# Patient Record
Sex: MALE | Race: WHITE | Employment: FULL TIME
[De-identification: names, ages, dates, MRNs, and addresses within clinical notes are randomized per-mention and may not be internally consistent; named-entity substitution may affect disease eponyms.]

---

## 2022-11-11 ENCOUNTER — OFFICE VISIT (OUTPATIENT)
Dept: INTERNAL MEDICINE CLINIC | Facility: CLINIC | Age: 37
End: 2022-11-11
Payer: COMMERCIAL

## 2022-11-11 VITALS
DIASTOLIC BLOOD PRESSURE: 88 MMHG | WEIGHT: 174 LBS | HEART RATE: 70 BPM | SYSTOLIC BLOOD PRESSURE: 117 MMHG | HEIGHT: 70 IN | TEMPERATURE: 98.1 F | OXYGEN SATURATION: 97 % | BODY MASS INDEX: 24.91 KG/M2

## 2022-11-11 DIAGNOSIS — R10.30 LOWER ABDOMINAL PAIN: Primary | ICD-10-CM

## 2022-11-11 DIAGNOSIS — Z23 NEED FOR PROPHYLACTIC VACCINATION AND INOCULATION AGAINST CHOLERA ALONE: ICD-10-CM

## 2022-11-11 PROCEDURE — G8482 FLU IMMUNIZE ORDER/ADMIN: HCPCS | Performed by: INTERNAL MEDICINE

## 2022-11-11 PROCEDURE — 1036F TOBACCO NON-USER: CPT | Performed by: INTERNAL MEDICINE

## 2022-11-11 PROCEDURE — 99203 OFFICE O/P NEW LOW 30 MIN: CPT | Performed by: INTERNAL MEDICINE

## 2022-11-11 PROCEDURE — 90674 CCIIV4 VAC NO PRSV 0.5 ML IM: CPT | Performed by: INTERNAL MEDICINE

## 2022-11-11 PROCEDURE — G8427 DOCREV CUR MEDS BY ELIG CLIN: HCPCS | Performed by: INTERNAL MEDICINE

## 2022-11-11 PROCEDURE — G8420 CALC BMI NORM PARAMETERS: HCPCS | Performed by: INTERNAL MEDICINE

## 2022-11-11 PROCEDURE — 90471 IMMUNIZATION ADMIN: CPT | Performed by: INTERNAL MEDICINE

## 2022-11-11 RX ORDER — GUSELKUMAB 100 MG/ML
INJECTION SUBCUTANEOUS
COMMUNITY

## 2022-11-11 ASSESSMENT — PATIENT HEALTH QUESTIONNAIRE - PHQ9
SUM OF ALL RESPONSES TO PHQ9 QUESTIONS 1 & 2: 0
2. FEELING DOWN, DEPRESSED OR HOPELESS: 0
SUM OF ALL RESPONSES TO PHQ QUESTIONS 1-9: 0
1. LITTLE INTEREST OR PLEASURE IN DOING THINGS: 0
SUM OF ALL RESPONSES TO PHQ QUESTIONS 1-9: 0

## 2022-11-11 ASSESSMENT — ENCOUNTER SYMPTOMS
VOMITING: 0
NAUSEA: 0
BLOOD IN STOOL: 0
FLATUS: 0
DIARRHEA: 0
HEMATOCHEZIA: 0
ABDOMINAL PAIN: 1
CONSTIPATION: 0
RECTAL PAIN: 0
SHORTNESS OF BREATH: 0

## 2022-11-11 ASSESSMENT — CROHNS DISEASE ACTIVITY INDEX (CDAI): CDAI SCORE: 0

## 2022-11-11 NOTE — PROGRESS NOTES
Trerie Brar M.D. Internal Medicine  [address] , [address]  Office : [phone]  Fax : [phone]    Chief Complaint   Patient presents with    Abdominal Pain     Abdominal pain some diarrhea x2weeks         History of Present Illness:  Elder Villela is a 40 y.o. male. Abdominal Pain  This is a new problem. The onset quality is gradual. The problem occurs intermittently. The most recent episode lasted 3 hours. The problem has been waxing and waning. The pain is located in the LLQ, RLQ and suprapubic region. The pain is mild. The quality of the pain is cramping. Pertinent negatives include no constipation, diarrhea, fever, flatus, hematochezia, melena, nausea or vomiting. Associated symptoms comments: 1 loose stool yeasterday. The pain is aggravated by eating. The pain is relieved by Nothing. Treatments tried: pepto-bismol. The treatment provided mild relief. There is no history of Crohn's disease or ulcerative colitis. Past Medical History:  Past Medical History:   Diagnosis Date    Allergic rhinitis     never tested    Furuncle of left lower limb     GERD (gastroesophageal reflux disease)     Psoriasis     Varicella      Past Surgical History:  Past Surgical History:   Procedure Laterality Date    HERNIA REPAIR Bilateral     SEPTOPLASTY      nasal fracture    WISDOM TOOTH EXTRACTION       Allergies:   No Known Allergies  Medications:   Current Outpatient Medications   Medication Sig Dispense Refill    Guselkumab (TREMFYA) 100 MG/ML SOPN Inject into the skin      calcipotriene (DOVONEX) 0.005 % cream APPLY TO AFFECTED AREA TWO (2) TIMES A DAY. (Patient not taking: Reported on 11/11/2022)       No current facility-administered medications for this visit. Social History:  Social History     Tobacco Use    Smoking status: Never    Smokeless tobacco: Never   Substance Use Topics    Alcohol use:  Yes     Alcohol/week: 4.0 standard drinks     Family History  Family History   Problem Relation Age of Onset    Lupus Sister     Cancer Father         skin       Review of Systems   Constitutional:  Negative for chills, fatigue, fever and unexpected weight change. Respiratory:  Negative for shortness of breath. Cardiovascular:  Negative for chest pain. Gastrointestinal:  Positive for abdominal pain. Negative for blood in stool, constipation, diarrhea, flatus, hematochezia, melena, nausea, rectal pain and vomiting. Vital Signs  /88 (Site: Left Upper Arm, Position: Sitting, Cuff Size: Large Adult)   Pulse 70   Temp 98.1 °F (36.7 °C) (Temporal)   Ht 5' 10\" (1.778 m)   Wt 174 lb (78.9 kg)   SpO2 97%   BMI 24.97 kg/m²   Body mass index is 24.97 kg/m². Physical Exam  Vitals reviewed. Constitutional:       General: He is not in acute distress. Appearance: Normal appearance. He is normal weight. He is not ill-appearing, toxic-appearing or diaphoretic. HENT:      Head: Normocephalic and atraumatic. Eyes:      General: No scleral icterus. Cardiovascular:      Rate and Rhythm: Normal rate and regular rhythm. Heart sounds: Normal heart sounds. No murmur heard. Pulmonary:      Effort: Pulmonary effort is normal.      Breath sounds: Normal breath sounds. Abdominal:      General: Abdomen is flat. Bowel sounds are normal. There is no distension or abdominal bruit. Palpations: Abdomen is soft. There is no hepatomegaly, splenomegaly, mass or pulsatile mass. Tenderness: There is abdominal tenderness in the left lower quadrant. There is rebound. There is no guarding. Negative signs include Hdez's sign and McBurney's sign. Hernia: No hernia is present. Skin:     Coloration: Skin is not jaundiced. Findings: No rash. Neurological:      General: No focal deficit present. Mental Status: He is alert. Mental status is at baseline. Cranial Nerves: No cranial nerve deficit. Motor: No weakness. Gait: Gait normal.   Psychiatric:         Mood and Affect: Mood normal.         Behavior: Behavior normal.         Thought Content: Thought content normal.         Judgment: Judgment normal.         Assessment/Plan:  Derl Closs was seen today for abdominal pain. Diagnoses and all orders for this visit:    Lower abdominal pain  -     US ABDOMEN COMPLETE; Future    Need for prophylactic vaccination and inoculation against cholera alone  -     Influenza, FLUCELVAX, (age 10 mo+), IM, Preservative Free, 0.5 mL  Differential diagnosis include IBS, Diverticulitis, Colitis and other issues. Trial low FODMAP diet. Pepto bismol if has loose stools    Return in about 4 weeks (around 12/9/2022), or if symptoms worsen or fail to improve.   __  Luz Elena Graff M.D.

## 2022-11-29 ENCOUNTER — HOSPITAL ENCOUNTER (OUTPATIENT)
Dept: ULTRASOUND IMAGING | Age: 37
Discharge: HOME | End: 2022-12-02
Payer: COMMERCIAL

## 2022-11-29 DIAGNOSIS — R10.30 LOWER ABDOMINAL PAIN: ICD-10-CM

## 2022-11-29 PROCEDURE — 76700 US EXAM ABDOM COMPLETE: CPT

## 2022-12-09 ENCOUNTER — OFFICE VISIT (OUTPATIENT)
Dept: INTERNAL MEDICINE CLINIC | Facility: CLINIC | Age: 37
End: 2022-12-09

## 2022-12-09 VITALS
OXYGEN SATURATION: 97 % | BODY MASS INDEX: 25.2 KG/M2 | DIASTOLIC BLOOD PRESSURE: 69 MMHG | HEART RATE: 70 BPM | TEMPERATURE: 97.7 F | HEIGHT: 70 IN | SYSTOLIC BLOOD PRESSURE: 112 MMHG | WEIGHT: 176 LBS

## 2022-12-09 DIAGNOSIS — Z00.00 ANNUAL PHYSICAL EXAM: Primary | ICD-10-CM

## 2022-12-09 DIAGNOSIS — Z00.00 ANNUAL PHYSICAL EXAM: ICD-10-CM

## 2022-12-09 LAB
ALBUMIN SERPL-MCNC: 4.3 G/DL (ref 3.5–5)
ALBUMIN/GLOB SERPL: 1.5 {RATIO} (ref 0.4–1.6)
ALP SERPL-CCNC: 62 U/L (ref 50–136)
ALT SERPL-CCNC: 35 U/L (ref 12–65)
ANION GAP SERPL CALC-SCNC: 4 MMOL/L (ref 2–11)
APPEARANCE UR: CLEAR
AST SERPL-CCNC: 23 U/L (ref 15–37)
BASOPHILS # BLD: 0.1 K/UL (ref 0–0.2)
BASOPHILS NFR BLD: 1 % (ref 0–2)
BILIRUB DIRECT SERPL-MCNC: 0.1 MG/DL
BILIRUB SERPL-MCNC: 0.6 MG/DL (ref 0.2–1.1)
BILIRUB UR QL: NEGATIVE
BUN SERPL-MCNC: 12 MG/DL (ref 6–23)
CALCIUM SERPL-MCNC: 9.2 MG/DL (ref 8.3–10.4)
CHLORIDE SERPL-SCNC: 109 MMOL/L (ref 101–110)
CHOLEST SERPL-MCNC: 201 MG/DL
CO2 SERPL-SCNC: 27 MMOL/L (ref 21–32)
COLOR UR: NORMAL
CREAT SERPL-MCNC: 0.9 MG/DL (ref 0.8–1.5)
DIFFERENTIAL METHOD BLD: NORMAL
EOSINOPHIL # BLD: 0.1 K/UL (ref 0–0.8)
EOSINOPHIL NFR BLD: 2 % (ref 0.5–7.8)
ERYTHROCYTE [DISTWIDTH] IN BLOOD BY AUTOMATED COUNT: 12.8 % (ref 11.9–14.6)
EST. AVERAGE GLUCOSE BLD GHB EST-MCNC: 103 MG/DL
GLOBULIN SER CALC-MCNC: 2.9 G/DL (ref 2.8–4.5)
GLUCOSE SERPL-MCNC: 84 MG/DL (ref 65–100)
GLUCOSE UR STRIP.AUTO-MCNC: NEGATIVE MG/DL
HBA1C MFR BLD: 5.2 % (ref 4.8–5.6)
HCT VFR BLD AUTO: 46.7 % (ref 41.1–50.3)
HDLC SERPL-MCNC: 49 MG/DL (ref 40–60)
HDLC SERPL: 4.1 {RATIO}
HGB BLD-MCNC: 15.5 G/DL (ref 13.6–17.2)
HGB UR QL STRIP: NEGATIVE
IMM GRANULOCYTES # BLD AUTO: 0 K/UL (ref 0–0.5)
IMM GRANULOCYTES NFR BLD AUTO: 1 % (ref 0–5)
KETONES UR QL STRIP.AUTO: NEGATIVE MG/DL
LDLC SERPL CALC-MCNC: 134 MG/DL
LEUKOCYTE ESTERASE UR QL STRIP.AUTO: NEGATIVE
LYMPHOCYTES # BLD: 0.9 K/UL (ref 0.5–4.6)
LYMPHOCYTES NFR BLD: 13 % (ref 13–44)
MCH RBC QN AUTO: 31.6 PG (ref 26.1–32.9)
MCHC RBC AUTO-ENTMCNC: 33.2 G/DL (ref 31.4–35)
MCV RBC AUTO: 95.3 FL (ref 82–102)
MONOCYTES # BLD: 0.6 K/UL (ref 0.1–1.3)
MONOCYTES NFR BLD: 9 % (ref 4–12)
NEUTS SEG # BLD: 5.2 K/UL (ref 1.7–8.2)
NEUTS SEG NFR BLD: 74 % (ref 43–78)
NITRITE UR QL STRIP.AUTO: NEGATIVE
NRBC # BLD: 0 K/UL (ref 0–0.2)
PH UR STRIP: 7 (ref 5–9)
PLATELET # BLD AUTO: 304 K/UL (ref 150–450)
PMV BLD AUTO: 10 FL (ref 9.4–12.3)
POTASSIUM SERPL-SCNC: 4.2 MMOL/L (ref 3.5–5.1)
PROT SERPL-MCNC: 7.2 G/DL (ref 6.3–8.2)
PROT UR STRIP-MCNC: NEGATIVE MG/DL
RBC # BLD AUTO: 4.9 M/UL (ref 4.23–5.6)
SODIUM SERPL-SCNC: 140 MMOL/L (ref 133–143)
SP GR UR REFRACTOMETRY: 1.01 (ref 1–1.02)
TRIGL SERPL-MCNC: 90 MG/DL (ref 35–150)
TSH, 3RD GENERATION: 1.13 UIU/ML (ref 0.36–3.74)
UROBILINOGEN UR QL STRIP.AUTO: 0.2 EU/DL (ref 0.2–1)
VLDLC SERPL CALC-MCNC: 18 MG/DL (ref 6–23)
WBC # BLD AUTO: 6.9 K/UL (ref 4.3–11.1)

## 2022-12-09 ASSESSMENT — PATIENT HEALTH QUESTIONNAIRE - PHQ9
SUM OF ALL RESPONSES TO PHQ QUESTIONS 1-9: 0
2. FEELING DOWN, DEPRESSED OR HOPELESS: 0
SUM OF ALL RESPONSES TO PHQ QUESTIONS 1-9: 0
SUM OF ALL RESPONSES TO PHQ9 QUESTIONS 1 & 2: 0
SUM OF ALL RESPONSES TO PHQ QUESTIONS 1-9: 0
SUM OF ALL RESPONSES TO PHQ QUESTIONS 1-9: 0
1. LITTLE INTEREST OR PLEASURE IN DOING THINGS: 0

## 2022-12-09 ASSESSMENT — ENCOUNTER SYMPTOMS
EYE REDNESS: 0
ABDOMINAL PAIN: 0
FACIAL SWELLING: 0
SHORTNESS OF BREATH: 0

## 2022-12-09 NOTE — PROGRESS NOTES
Mil Godinez M.D. Internal Medicine  [address] [place] [address]  Office : [phone]  Fax : [phone]    Chief Complaint   Patient presents with    Annual Exam     Annual CPE       History of Present Illness:  Isacc Michael is a 40 y.o. male. HPI    Wellness Physical  Patient feels well today. Health Maintenance updated and appropriate screenings/preventative treatments are ordered if patient gives consent. Counseling/anticipatory guidance/risk factor reduction interventions appropriate for this patient are discussed including but not limited to:    Achieving normal body weight with low carb diet and intermittent fasting  Regular moderate exercise  Smoking cessation if appropriate  Avoidance of excessive alcohol/caffeine use  Seatbelt use  Keeping medical appointments  Taking medication as directed        Past Medical History:  Past Medical History:   Diagnosis Date    Allergic rhinitis     never tested    Furuncle of left lower limb     GERD (gastroesophageal reflux disease)     Psoriasis     Varicella      Past Surgical History:  Past Surgical History:   Procedure Laterality Date    HERNIA REPAIR Bilateral     SEPTOPLASTY      nasal fracture    WISDOM TOOTH EXTRACTION       Allergies:   No Known Allergies  Medications:   Current Outpatient Medications   Medication Sig Dispense Refill    Guselkumab (TREMFYA) 100 MG/ML SOPN Inject into the skin      calcipotriene (DOVONEX) 0.005 % cream APPLY TO AFFECTED AREA TWO (2) TIMES A DAY. (Patient not taking: No sig reported)       No current facility-administered medications for this visit. Social History:  Social History     Tobacco Use    Smoking status: Never    Smokeless tobacco: Never   Substance Use Topics    Alcohol use:  Yes     Alcohol/week: 4.0 standard drinks     Family History  Family History   Problem Relation Age of Onset    Lupus Sister     Cancer Father         skin Review of Systems   Constitutional:  Negative for chills, fatigue and fever. HENT:  Negative for facial swelling. Eyes:  Negative for redness. Respiratory:  Negative for shortness of breath. Cardiovascular:  Negative for chest pain. Gastrointestinal:  Negative for abdominal pain. Genitourinary:  Negative for flank pain. Musculoskeletal:  Negative for gait problem. Skin:  Negative for rash. Neurological:  Negative for speech difficulty. Psychiatric/Behavioral:  Negative for dysphoric mood. Vital Signs  /69 (Site: Left Upper Arm, Position: Sitting, Cuff Size: Large Adult)   Pulse 70   Temp 97.7 °F (36.5 °C) (Temporal)   Ht 5' 10\" (1.778 m)   Wt 176 lb (79.8 kg)   SpO2 97%   BMI 25.25 kg/m²   Body mass index is 25.25 kg/m². Physical Exam  Vitals reviewed. Constitutional:       General: He is not in acute distress. Appearance: Normal appearance. He is not ill-appearing or toxic-appearing. HENT:      Head: Normocephalic and atraumatic. Right Ear: Tympanic membrane, ear canal and external ear normal. There is no impacted cerumen. Left Ear: Tympanic membrane, ear canal and external ear normal. There is no impacted cerumen. Nose: Nose normal.      Mouth/Throat:      Mouth: Mucous membranes are moist.      Pharynx: No oropharyngeal exudate or posterior oropharyngeal erythema. Eyes:      General: No scleral icterus. Extraocular Movements: Extraocular movements intact. Conjunctiva/sclera: Conjunctivae normal.   Neck:      Vascular: No carotid bruit. Cardiovascular:      Rate and Rhythm: Normal rate and regular rhythm. Heart sounds: Normal heart sounds. No murmur heard. Pulmonary:      Effort: Pulmonary effort is normal.      Breath sounds: Normal breath sounds. Abdominal:      General: Bowel sounds are normal. There is no distension. Palpations: Abdomen is soft. There is no mass. Tenderness: There is no abdominal tenderness. Musculoskeletal:         General: No swelling. Skin:     Coloration: Skin is not jaundiced. Findings: No rash. Neurological:      General: No focal deficit present. Mental Status: He is alert. Mental status is at baseline. Cranial Nerves: No cranial nerve deficit. Motor: No weakness. Gait: Gait normal.      Deep Tendon Reflexes: Reflexes normal.   Psychiatric:         Mood and Affect: Mood normal.         Behavior: Behavior normal.         Thought Content: Thought content normal.         Judgment: Judgment normal.         Assessment/Plan:  Erin Stapleton was seen today for annual exam.    Diagnoses and all orders for this visit:    Annual physical exam  -     Basic Metabolic Panel; Future  -     CBC with Auto Differential; Future  -     Hepatic Function Panel; Future  -     TSH; Future  -     Lipid Panel; Future  -     Urinalysis; Future  -     Hemoglobin A1C; Future      Return in about 1 year (around 12/9/2023), or if symptoms worsen or fail to improve.   __  Natasha Patel M.D.

## 2024-03-11 ENCOUNTER — OFFICE VISIT (OUTPATIENT)
Dept: INTERNAL MEDICINE CLINIC | Facility: CLINIC | Age: 39
End: 2024-03-11
Payer: COMMERCIAL

## 2024-03-11 VITALS
HEIGHT: 71 IN | WEIGHT: 177 LBS | TEMPERATURE: 97.9 F | BODY MASS INDEX: 24.78 KG/M2 | HEART RATE: 56 BPM | SYSTOLIC BLOOD PRESSURE: 111 MMHG | DIASTOLIC BLOOD PRESSURE: 76 MMHG | OXYGEN SATURATION: 100 %

## 2024-03-11 DIAGNOSIS — Z01.89 PATIENT REQUEST FOR DIAGNOSTIC TESTING: ICD-10-CM

## 2024-03-11 DIAGNOSIS — Z00.00 ANNUAL PHYSICAL EXAM: Primary | ICD-10-CM

## 2024-03-11 PROCEDURE — 99395 PREV VISIT EST AGE 18-39: CPT | Performed by: INTERNAL MEDICINE

## 2024-03-11 SDOH — ECONOMIC STABILITY: INCOME INSECURITY: HOW HARD IS IT FOR YOU TO PAY FOR THE VERY BASICS LIKE FOOD, HOUSING, MEDICAL CARE, AND HEATING?: NOT HARD AT ALL

## 2024-03-11 SDOH — ECONOMIC STABILITY: FOOD INSECURITY: WITHIN THE PAST 12 MONTHS, YOU WORRIED THAT YOUR FOOD WOULD RUN OUT BEFORE YOU GOT MONEY TO BUY MORE.: NEVER TRUE

## 2024-03-11 SDOH — ECONOMIC STABILITY: HOUSING INSECURITY
IN THE LAST 12 MONTHS, WAS THERE A TIME WHEN YOU DID NOT HAVE A STEADY PLACE TO SLEEP OR SLEPT IN A SHELTER (INCLUDING NOW)?: NO

## 2024-03-11 SDOH — ECONOMIC STABILITY: FOOD INSECURITY: WITHIN THE PAST 12 MONTHS, THE FOOD YOU BOUGHT JUST DIDN'T LAST AND YOU DIDN'T HAVE MONEY TO GET MORE.: NEVER TRUE

## 2024-03-11 ASSESSMENT — PATIENT HEALTH QUESTIONNAIRE - PHQ9
SUM OF ALL RESPONSES TO PHQ QUESTIONS 1-9: 0
SUM OF ALL RESPONSES TO PHQ QUESTIONS 1-9: 0
SUM OF ALL RESPONSES TO PHQ9 QUESTIONS 1 & 2: 0
1. LITTLE INTEREST OR PLEASURE IN DOING THINGS: 0
SUM OF ALL RESPONSES TO PHQ QUESTIONS 1-9: 0
2. FEELING DOWN, DEPRESSED OR HOPELESS: 0
SUM OF ALL RESPONSES TO PHQ QUESTIONS 1-9: 0

## 2024-03-11 ASSESSMENT — ENCOUNTER SYMPTOMS
DIARRHEA: 0
EYE PAIN: 0
ABDOMINAL PAIN: 0
SHORTNESS OF BREATH: 0
CONSTIPATION: 0

## 2024-03-12 ENCOUNTER — NURSE ONLY (OUTPATIENT)
Dept: INTERNAL MEDICINE CLINIC | Facility: CLINIC | Age: 39
End: 2024-03-12

## 2024-03-12 DIAGNOSIS — Z00.00 ANNUAL PHYSICAL EXAM: ICD-10-CM

## 2024-03-12 DIAGNOSIS — Z01.89 PATIENT REQUEST FOR DIAGNOSTIC TESTING: ICD-10-CM

## 2024-03-12 LAB
ALBUMIN SERPL-MCNC: 4.4 G/DL (ref 3.5–5)
ALBUMIN/GLOB SERPL: 1.5 (ref 0.4–1.6)
ALP SERPL-CCNC: 65 U/L (ref 50–136)
ALT SERPL-CCNC: 26 U/L (ref 12–65)
ANION GAP SERPL CALC-SCNC: 6 MMOL/L (ref 2–11)
APPEARANCE UR: CLEAR
AST SERPL-CCNC: 21 U/L (ref 15–37)
BASOPHILS # BLD: 0.1 K/UL (ref 0–0.2)
BASOPHILS NFR BLD: 2 % (ref 0–2)
BILIRUB DIRECT SERPL-MCNC: 0.2 MG/DL
BILIRUB SERPL-MCNC: 0.9 MG/DL (ref 0.2–1.1)
BILIRUB UR QL: NEGATIVE
BUN SERPL-MCNC: 10 MG/DL (ref 6–23)
CALCIUM SERPL-MCNC: 9.6 MG/DL (ref 8.3–10.4)
CHLORIDE SERPL-SCNC: 105 MMOL/L (ref 103–113)
CHOLEST SERPL-MCNC: 215 MG/DL
CO2 SERPL-SCNC: 29 MMOL/L (ref 21–32)
COLOR UR: NORMAL
CREAT SERPL-MCNC: 0.9 MG/DL (ref 0.8–1.5)
DIFFERENTIAL METHOD BLD: ABNORMAL
EOSINOPHIL # BLD: 0.2 K/UL (ref 0–0.8)
EOSINOPHIL NFR BLD: 4 % (ref 0.5–7.8)
ERYTHROCYTE [DISTWIDTH] IN BLOOD BY AUTOMATED COUNT: 13.1 % (ref 11.9–14.6)
GLOBULIN SER CALC-MCNC: 3 G/DL (ref 2.8–4.5)
GLUCOSE SERPL-MCNC: 96 MG/DL (ref 65–100)
GLUCOSE UR STRIP.AUTO-MCNC: NEGATIVE MG/DL
HCT VFR BLD AUTO: 47.1 % (ref 41.1–50.3)
HDLC SERPL-MCNC: 70 MG/DL (ref 40–60)
HDLC SERPL: 3.1
HGB BLD-MCNC: 15.8 G/DL (ref 13.6–17.2)
HGB UR QL STRIP: NEGATIVE
IMM GRANULOCYTES # BLD AUTO: 0 K/UL (ref 0–0.5)
IMM GRANULOCYTES NFR BLD AUTO: 0 % (ref 0–5)
KETONES UR QL STRIP.AUTO: NEGATIVE MG/DL
LDLC SERPL CALC-MCNC: 129.4 MG/DL
LEUKOCYTE ESTERASE UR QL STRIP.AUTO: NEGATIVE
LYMPHOCYTES # BLD: 1.1 K/UL (ref 0.5–4.6)
LYMPHOCYTES NFR BLD: 22 % (ref 13–44)
MCH RBC QN AUTO: 31.3 PG (ref 26.1–32.9)
MCHC RBC AUTO-ENTMCNC: 33.5 G/DL (ref 31.4–35)
MCV RBC AUTO: 93.3 FL (ref 82–102)
MONOCYTES # BLD: 0.6 K/UL (ref 0.1–1.3)
MONOCYTES NFR BLD: 13 % (ref 4–12)
NEUTS SEG # BLD: 2.8 K/UL (ref 1.7–8.2)
NEUTS SEG NFR BLD: 59 % (ref 43–78)
NITRITE UR QL STRIP.AUTO: NEGATIVE
NRBC # BLD: 0 K/UL (ref 0–0.2)
PH UR STRIP: 6.5 (ref 5–9)
PLATELET # BLD AUTO: 289 K/UL (ref 150–450)
PMV BLD AUTO: 9.9 FL (ref 9.4–12.3)
POTASSIUM SERPL-SCNC: 4 MMOL/L (ref 3.5–5.1)
PROT SERPL-MCNC: 7.4 G/DL (ref 6.3–8.2)
PROT UR STRIP-MCNC: NEGATIVE MG/DL
PSA SERPL-MCNC: 0.4 NG/ML
RBC # BLD AUTO: 5.05 M/UL (ref 4.23–5.6)
SODIUM SERPL-SCNC: 140 MMOL/L (ref 136–146)
SP GR UR REFRACTOMETRY: 1.01 (ref 1–1.02)
TRIGL SERPL-MCNC: 78 MG/DL (ref 35–150)
TSH, 3RD GENERATION: 1.84 UIU/ML (ref 0.36–3.74)
UROBILINOGEN UR QL STRIP.AUTO: 0.2 EU/DL (ref 0.2–1)
VLDLC SERPL CALC-MCNC: 15.6 MG/DL (ref 6–23)
WBC # BLD AUTO: 4.7 K/UL (ref 4.3–11.1)

## 2024-04-10 ENCOUNTER — TELEPHONE (OUTPATIENT)
Dept: INTERNAL MEDICINE CLINIC | Facility: CLINIC | Age: 39
End: 2024-04-10

## 2024-04-10 NOTE — TELEPHONE ENCOUNTER
Mr. Sivertson called and he wants to schedule an appointment with Dr. Lucero. His contact number is [phone].

## 2024-04-22 ENCOUNTER — OFFICE VISIT (OUTPATIENT)
Dept: INTERNAL MEDICINE CLINIC | Facility: CLINIC | Age: 39
End: 2024-04-22
Payer: COMMERCIAL

## 2024-04-22 VITALS
OXYGEN SATURATION: 99 % | BODY MASS INDEX: 25.2 KG/M2 | HEART RATE: 66 BPM | DIASTOLIC BLOOD PRESSURE: 82 MMHG | WEIGHT: 180 LBS | HEIGHT: 71 IN | SYSTOLIC BLOOD PRESSURE: 117 MMHG

## 2024-04-22 DIAGNOSIS — R10.32 LEFT LOWER QUADRANT ABDOMINAL PAIN: ICD-10-CM

## 2024-04-22 DIAGNOSIS — R10.32 LEFT LOWER QUADRANT ABDOMINAL PAIN: Primary | ICD-10-CM

## 2024-04-22 PROCEDURE — 99214 OFFICE O/P EST MOD 30 MIN: CPT | Performed by: INTERNAL MEDICINE

## 2024-04-22 SDOH — ECONOMIC STABILITY: FOOD INSECURITY: WITHIN THE PAST 12 MONTHS, THE FOOD YOU BOUGHT JUST DIDN'T LAST AND YOU DIDN'T HAVE MONEY TO GET MORE.: NEVER TRUE

## 2024-04-22 SDOH — ECONOMIC STABILITY: INCOME INSECURITY: HOW HARD IS IT FOR YOU TO PAY FOR THE VERY BASICS LIKE FOOD, HOUSING, MEDICAL CARE, AND HEATING?: NOT HARD AT ALL

## 2024-04-22 SDOH — ECONOMIC STABILITY: FOOD INSECURITY: WITHIN THE PAST 12 MONTHS, YOU WORRIED THAT YOUR FOOD WOULD RUN OUT BEFORE YOU GOT MONEY TO BUY MORE.: NEVER TRUE

## 2024-04-22 ASSESSMENT — ENCOUNTER SYMPTOMS
FLATUS: 1
ABDOMINAL PAIN: 1
NAUSEA: 0
HEMATOCHEZIA: 0
SHORTNESS OF BREATH: 0
ANAL BLEEDING: 0
VOMITING: 0
BLOOD IN STOOL: 0
DIARRHEA: 1

## 2024-04-22 ASSESSMENT — ANXIETY QUESTIONNAIRES
5. BEING SO RESTLESS THAT IT IS HARD TO SIT STILL: NOT AT ALL
2. NOT BEING ABLE TO STOP OR CONTROL WORRYING: NOT AT ALL
6. BECOMING EASILY ANNOYED OR IRRITABLE: NOT AT ALL
1. FEELING NERVOUS, ANXIOUS, OR ON EDGE: NOT AT ALL
7. FEELING AFRAID AS IF SOMETHING AWFUL MIGHT HAPPEN: NOT AT ALL
IF YOU CHECKED OFF ANY PROBLEMS ON THIS QUESTIONNAIRE, HOW DIFFICULT HAVE THESE PROBLEMS MADE IT FOR YOU TO DO YOUR WORK, TAKE CARE OF THINGS AT HOME, OR GET ALONG WITH OTHER PEOPLE: NOT DIFFICULT AT ALL
4. TROUBLE RELAXING: NOT AT ALL
3. WORRYING TOO MUCH ABOUT DIFFERENT THINGS: NOT AT ALL
GAD7 TOTAL SCORE: 0

## 2024-04-22 ASSESSMENT — PATIENT HEALTH QUESTIONNAIRE - PHQ9
2. FEELING DOWN, DEPRESSED OR HOPELESS: NOT AT ALL
SUM OF ALL RESPONSES TO PHQ QUESTIONS 1-9: 0
SUM OF ALL RESPONSES TO PHQ9 QUESTIONS 1 & 2: 0
SUM OF ALL RESPONSES TO PHQ QUESTIONS 1-9: 0
1. LITTLE INTEREST OR PLEASURE IN DOING THINGS: NOT AT ALL
SUM OF ALL RESPONSES TO PHQ QUESTIONS 1-9: 0
SUM OF ALL RESPONSES TO PHQ QUESTIONS 1-9: 0

## 2024-04-22 NOTE — PROGRESS NOTES
[facility]  Gera Lucero M.D.  Internal Medicine  [address] [place] [address]  Office : [phone]  Fax : [phone]    Chief Complaint   Patient presents with    Abdominal Pain     Ongoing stomach pain for the past year seem to get worsen        History of Present Illness:  Jordon Underwood is a 38 y.o. male.  Abdominal Pain  This is a recurrent problem. The current episode started more than 1 year ago. The onset quality is gradual. The problem occurs intermittently. The problem has been unchanged. The pain is located in the LLQ. The pain is mild. The quality of the pain is colicky and cramping. The abdominal pain does not radiate. Associated symptoms include diarrhea and flatus. Pertinent negatives include no anorexia, fever, hematochezia, melena, nausea, vomiting or weight loss. The pain is aggravated by eating. Relieved by: pepto bismol. Treatments tried: pepto bismol. The treatment provided mild relief. Prior diagnostic workup includes ultrasound.   Son has Sprue        Past Medical History:  Past Medical History:   Diagnosis Date    Allergic rhinitis     never tested    Furuncle of left lower limb     GERD (gastroesophageal reflux disease)     Psoriasis     Varicella      Past Surgical History:  Past Surgical History:   Procedure Laterality Date    HERNIA REPAIR Bilateral     SEPTOPLASTY      nasal fracture    WISDOM TOOTH EXTRACTION       Allergies:   No Known Allergies  Medications:   Current Outpatient Medications   Medication Sig Dispense Refill    Guselkumab (TREMFYA) 100 MG/ML SOPN Inject into the skin       No current facility-administered medications for this visit.     Social History:  Social History     Tobacco Use    Smoking status: Never    Smokeless tobacco: Never   Substance Use Topics    Alcohol use: Yes     Alcohol/week: 4.0 standard drinks of alcohol     Family History  Family History   Problem Relation Age of Onset    Lupus

## 2024-04-23 LAB
GLIADIN PEPTIDE IGA SER-ACNC: 67 UNITS (ref 0–19)
GLIADIN PEPTIDE IGG SER-ACNC: 16 UNITS (ref 0–19)
IGA SERPL-MCNC: 317 MG/DL (ref 90–386)
IGA SERPL-MCNC: 331 MG/DL (ref 90–386)
TTG IGA SER-ACNC: 11 U/ML (ref 0–3)
TTG IGG SER-ACNC: 3 U/ML (ref 0–5)

## 2024-05-30 ENCOUNTER — PATIENT MESSAGE (OUTPATIENT)
Dept: INTERNAL MEDICINE CLINIC | Facility: CLINIC | Age: 39
End: 2024-05-30

## 2024-05-30 DIAGNOSIS — K90.0 CELIAC SPRUE: ICD-10-CM

## 2024-05-30 DIAGNOSIS — R10.30 LOWER ABDOMINAL PAIN: Primary | ICD-10-CM

## 2024-05-31 NOTE — TELEPHONE ENCOUNTER
Luz Marina Palomino 5/30/2024 4:26 PM EDT      ----- Message -----  From: Jordon Underwood  Sent: 5/30/2024 4:05 PM EDT  To: [facility] Clinical Staff  Subject: Referral to Gastroenterology     Hi Dr. 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 Confirmation by gastroenterology is advised.\" Could you please provide a referral to a Gastroenterology provider?    Thank you!    Jordon

## 2024-06-03 ENCOUNTER — TELEPHONE (OUTPATIENT)
Dept: INTERNAL MEDICINE CLINIC | Facility: CLINIC | Age: 39
End: 2024-06-03

## 2024-06-03 NOTE — TELEPHONE ENCOUNTER
----- Message from Gracelaura Quinonezelkin Yepez sent at 5/30/2024  3:55 PM EDT -----  Regarding: ECC Message to Provider  ECC Message to Provider    Relationship to Patient: Self     Additional Information Pt called saying he had the result from the blood work and see some indicators that he might have a celiac disease and so he is asking if he needs to have a referral to see a GI and if what's the next thing he should do   --------------------------------------------------------------------------------------------------------------------------    Call Back Information: OK to leave message on voicemail  Preferred Call Back Number: Phone [phone]       A referral was already placed on 5/31/24 to GI.

## 2024-06-05 ENCOUNTER — TELEPHONE (OUTPATIENT)
Dept: INTERNAL MEDICINE CLINIC | Facility: CLINIC | Age: 39
End: 2024-06-05

## 2024-06-05 NOTE — TELEPHONE ENCOUNTER
----- Message from Shanda Mills MA sent at 6/3/2024 11:06 AM EDT -----  Regarding: FW: ECC Message to Provider    ----- Message -----  From: Princess Noemi Yepez  Sent: 5/30/2024   4:00 PM EDT  To: [facility] Clinical Staff  Subject: ECC Message to Provider                          ECC Message to Provider    Relationship to Patient: Self     Additional Information Pt called saying he had the result from the blood work and see some indicators that he might have a celiac disease and so he is asking if he needs to have a referral to see a GI and if what's the next thing he should do   --------------------------------------------------------------------------------------------------------------------------    Call Back Information: OK to leave message on voicemail  Preferred Call Back Number: Phone [phone]

## 2024-06-05 NOTE — TELEPHONE ENCOUNTER
Spoke with pt and notified him hat a gastro referral was sent on 05/31/2024 an to wait for them to call for a available appoint day and time

## 2024-07-26 NOTE — PROGRESS NOTES
History:   Mr. Jordon Underwood is a 39 y.o. male presenting with bloating, left lower quadrant cramping and diarrhea 3-5 times a week.  He reports that his 6-year-old son was diagnosed with celiac disease.  PCP check labs for celiac disease and it does appear he is positive for celiac disease as well.    Past medical, family and social histories, as well as medications and allergies, were reviewed and updated in the medical record as appropriate.    Past Medical History:   Diagnosis Date    Allergic rhinitis     never tested    Furuncle of left lower limb     GERD (gastroesophageal reflux disease)     Psoriasis     Varicella        Past Surgical History:   Procedure Laterality Date    HERNIA REPAIR Bilateral     SEPTOPLASTY      nasal fracture    WISDOM TOOTH EXTRACTION         Social History     Tobacco Use    Smoking status: Never    Smokeless tobacco: Never   Substance Use Topics    Alcohol use: Yes     Alcohol/week: 4.0 standard drinks of alcohol    Drug use: No       MEDs:     Current Outpatient Medications   Medication Sig Dispense Refill    Guselkumab (TREMFYA) 100 MG/ML SOPN Inject into the skin       No current facility-administered medications for this visit.       ALLERGIES:    No Known Allergies    ROS:     Review of Systems   Constitutional:  Negative for activity change and fatigue.   HENT:  Negative for trouble swallowing.    Respiratory:  Negative for shortness of breath.    Cardiovascular:  Negative for chest pain.   Gastrointestinal:  Positive for abdominal pain and diarrhea. Negative for abdominal distention, blood in stool, constipation, nausea and vomiting.   Skin:  Negative for color change.   Neurological:  Negative for weakness.   Psychiatric/Behavioral:  Negative for confusion.         PHYSICAL EXAMINATION    /84   Pulse 59   Resp 18   Wt 81.6 kg (180 lb)   SpO2 94%   BMI 25.46 kg/m²     Physical Exam  Constitutional:       Appearance: Normal appearance.   HENT:

## 2024-07-29 ENCOUNTER — PREP FOR PROCEDURE (OUTPATIENT)
Dept: GASTROENTEROLOGY | Age: 39
End: 2024-07-29

## 2024-07-29 ENCOUNTER — OFFICE VISIT (OUTPATIENT)
Dept: GASTROENTEROLOGY | Age: 39
End: 2024-07-29
Payer: COMMERCIAL

## 2024-07-29 VITALS
WEIGHT: 180 LBS | RESPIRATION RATE: 18 BRPM | OXYGEN SATURATION: 94 % | SYSTOLIC BLOOD PRESSURE: 117 MMHG | HEART RATE: 59 BPM | DIASTOLIC BLOOD PRESSURE: 84 MMHG | BODY MASS INDEX: 25.46 KG/M2

## 2024-07-29 DIAGNOSIS — R10.30 LOWER ABDOMINAL PAIN: ICD-10-CM

## 2024-07-29 DIAGNOSIS — K90.0 CELIAC DISEASE: Primary | ICD-10-CM

## 2024-07-29 DIAGNOSIS — R10.32 LLQ PAIN: ICD-10-CM

## 2024-07-29 DIAGNOSIS — R19.7 DIARRHEA DUE TO MALABSORPTION: ICD-10-CM

## 2024-07-29 DIAGNOSIS — K90.0 CELIAC DISEASE: ICD-10-CM

## 2024-07-29 DIAGNOSIS — K90.9 DIARRHEA DUE TO MALABSORPTION: ICD-10-CM

## 2024-07-29 PROCEDURE — 99244 OFF/OP CNSLTJ NEW/EST MOD 40: CPT | Performed by: INTERNAL MEDICINE

## 2024-07-29 RX ORDER — SODIUM CHLORIDE 0.9 % (FLUSH) 0.9 %
5-40 SYRINGE (ML) INJECTION EVERY 12 HOURS SCHEDULED
Status: CANCELLED | OUTPATIENT
Start: 2024-07-29

## 2024-07-29 RX ORDER — SODIUM CHLORIDE 9 MG/ML
25 INJECTION, SOLUTION INTRAVENOUS PRN
Status: CANCELLED | OUTPATIENT
Start: 2024-07-29

## 2024-07-29 RX ORDER — SODIUM CHLORIDE 0.9 % (FLUSH) 0.9 %
5-40 SYRINGE (ML) INJECTION PRN
Status: CANCELLED | OUTPATIENT
Start: 2024-07-29

## 2024-07-29 ASSESSMENT — ENCOUNTER SYMPTOMS
BLOOD IN STOOL: 0
DIARRHEA: 1
COLOR CHANGE: 0
NAUSEA: 0
ABDOMINAL PAIN: 1
ABDOMINAL DISTENTION: 0
VOMITING: 0
TROUBLE SWALLOWING: 0
SHORTNESS OF BREATH: 0
CONSTIPATION: 0

## 2024-08-02 DIAGNOSIS — K90.0 CELIAC DISEASE: ICD-10-CM

## 2024-08-02 LAB
25(OH)D3 SERPL-MCNC: 20.8 NG/ML (ref 30–100)
FERRITIN SERPL-MCNC: 241 NG/ML (ref 8–388)
FOLATE SERPL-MCNC: 6 NG/ML (ref 3.1–17.5)
INR PPP: 1
IRON SATN MFR SERPL: 28 % (ref 20–50)
IRON SERPL-MCNC: 76 UG/DL (ref 35–100)
MAGNESIUM SERPL-MCNC: 2 MG/DL (ref 1.8–2.4)
PROTHROMBIN TIME: 13.7 SEC (ref 11.3–14.9)
TIBC SERPL-MCNC: 275 UG/DL (ref 240–450)
UIBC SERPL-MCNC: 199 UG/DL (ref 112–347)
VIT B12 SERPL-MCNC: 249 PG/ML (ref 193–986)

## 2024-08-05 LAB
SELENIUM SERPL-MCNC: 131 UG/L (ref 93–198)
VIT A SERPL-MCNC: 43.6 UG/DL (ref 18.9–57.3)
VIT B1 BLD-SCNC: 119.2 NMOL/L (ref 66.5–200)

## 2024-08-05 RX ORDER — CETIRIZINE HYDROCHLORIDE 5 MG/1
5 TABLET ORAL DAILY
COMMUNITY

## 2024-08-05 NOTE — PROGRESS NOTES
Patient verified name, , and procedure.    Type: 1a; abbreviated assessment per anesthesia guidelines    Labs per anesthesia: none    Instructed pt that they will be notified the day before their procedure by the GI Lab for time of arrival if their procedure is [place] and Pre-op for [place] cases. Arrival times should be called by 5 pm. If no phone is received the patient should contact their respective hospital. The GI lab telephone number is [phone] and ES Pre-op is [phone].     Follow diet and prep instructions per office including NPO status.      Bath or shower the night before and the am of surgery with non-moisturizing soap. No lotions, oils, powders, cologne on skin. No make up, eye make up or jewelry. Wear loose fitting comfortable, clean clothing.     Must have adult present in building the entire time .     Medications for the day of procedure none, patient to hold none per anesthesia guidelines.     The following discharge instructions reviewed with patient: medication given during procedure may cause drowsiness for several hours, therefore, do not drive or operate machinery for remainder of the day. You may not drink alcohol on the day of your procedure, please resume regular diet and activity unless otherwise directed. You may experience abdominal distention for several hours that is relieved by the passage of gas. Contact your physician if you have any of the following: fever or chills, severe abdominal pain or excessive amount of bleeding or a large amount when having a bowel movement. Occasional specks of blood with bowel movement would not be unusual.

## 2024-08-06 LAB
COPPER SERPL-MCNC: 73 UG/DL (ref 69–132)
VITAMIN E ALPHA: 8.7 MG/L (ref 5.9–19.4)
VITAMIN E GAMMA: 1.2 MG/L (ref 0.7–4.9)
ZINC SERPL-MCNC: 72 UG/DL (ref 44–115)

## 2024-08-08 ENCOUNTER — TELEPHONE (OUTPATIENT)
Dept: GASTROENTEROLOGY | Age: 39
End: 2024-08-08

## 2024-08-08 ENCOUNTER — ANESTHESIA EVENT (OUTPATIENT)
Dept: ENDOSCOPY | Age: 39
End: 2024-08-08
Payer: COMMERCIAL

## 2024-08-08 RX ORDER — NALOXONE HYDROCHLORIDE 0.4 MG/ML
INJECTION, SOLUTION INTRAMUSCULAR; INTRAVENOUS; SUBCUTANEOUS PRN
Status: CANCELLED | OUTPATIENT
Start: 2024-08-08

## 2024-08-08 NOTE — TELEPHONE ENCOUNTER
Called patient with lab results per Dr Adams:    \"Lab results show evidence of a vitamin D deficiency, would recommend daily supplementation.\"    Vit D, 25-Hydroxy  30.0 - 100.0 ng/mL 20.8 Low        Patient advised that all labs were within the normal range except for Vit D. Reviewed recommendation with patient. He verbalized agreement/ understanding.

## 2024-08-08 NOTE — PROGRESS NOTES
Called and confirmed scheduled procedure for patient. Informed on patients arrival time 0600 for 0725 procedure, entry location ( [facility]), and  policy. Patient informed that someone needs to be in waiting area at all times.  will be close friend Opportunity for questions provided, no voiced concerns.

## 2024-08-09 ENCOUNTER — ANESTHESIA (OUTPATIENT)
Dept: ENDOSCOPY | Age: 39
End: 2024-08-09
Payer: COMMERCIAL

## 2024-08-09 ENCOUNTER — HOSPITAL ENCOUNTER (OUTPATIENT)
Age: 39
Setting detail: OUTPATIENT SURGERY
Discharge: HOME | End: 2024-08-09
Attending: INTERNAL MEDICINE | Admitting: INTERNAL MEDICINE
Payer: COMMERCIAL

## 2024-08-09 VITALS
HEIGHT: 70 IN | DIASTOLIC BLOOD PRESSURE: 83 MMHG | TEMPERATURE: 97.9 F | OXYGEN SATURATION: 99 % | BODY MASS INDEX: 25.77 KG/M2 | RESPIRATION RATE: 22 BRPM | WEIGHT: 180 LBS | SYSTOLIC BLOOD PRESSURE: 126 MMHG | HEART RATE: 53 BPM

## 2024-08-09 PROCEDURE — 6360000002 HC RX W HCPCS: Performed by: REGISTERED NURSE

## 2024-08-09 PROCEDURE — 7100000011 HC PHASE II RECOVERY - ADDTL 15 MIN: Performed by: INTERNAL MEDICINE

## 2024-08-09 PROCEDURE — 2500000003 HC RX 250 WO HCPCS: Performed by: REGISTERED NURSE

## 2024-08-09 PROCEDURE — 2709999900 HC NON-CHARGEABLE SUPPLY: Performed by: INTERNAL MEDICINE

## 2024-08-09 PROCEDURE — 43239 EGD BIOPSY SINGLE/MULTIPLE: CPT | Performed by: INTERNAL MEDICINE

## 2024-08-09 PROCEDURE — 7100000010 HC PHASE II RECOVERY - FIRST 15 MIN: Performed by: INTERNAL MEDICINE

## 2024-08-09 PROCEDURE — 2580000003 HC RX 258: Performed by: STUDENT IN AN ORGANIZED HEALTH CARE EDUCATION/TRAINING PROGRAM

## 2024-08-09 PROCEDURE — 3700000000 HC ANESTHESIA ATTENDED CARE: Performed by: INTERNAL MEDICINE

## 2024-08-09 PROCEDURE — 3609010300 HC COLONOSCOPY W/BIOPSY SINGLE/MULTIPLE: Performed by: INTERNAL MEDICINE

## 2024-08-09 PROCEDURE — 88312 SPECIAL STAINS GROUP 1: CPT

## 2024-08-09 PROCEDURE — 3609012400 HC EGD TRANSORAL BIOPSY SINGLE/MULTIPLE: Performed by: INTERNAL MEDICINE

## 2024-08-09 PROCEDURE — 45380 COLONOSCOPY AND BIOPSY: CPT | Performed by: INTERNAL MEDICINE

## 2024-08-09 PROCEDURE — 3700000001 HC ADD 15 MINUTES (ANESTHESIA): Performed by: INTERNAL MEDICINE

## 2024-08-09 PROCEDURE — 88305 TISSUE EXAM BY PATHOLOGIST: CPT

## 2024-08-09 RX ORDER — SODIUM CHLORIDE 0.9 % (FLUSH) 0.9 %
5-40 SYRINGE (ML) INJECTION PRN
Status: DISCONTINUED | OUTPATIENT
Start: 2024-08-09 | End: 2024-08-09 | Stop reason: HOSPADM

## 2024-08-09 RX ORDER — OMEPRAZOLE 20 MG/1
20 CAPSULE, DELAYED RELEASE ORAL
Qty: 30 CAPSULE | Refills: 3 | Status: SHIPPED | OUTPATIENT
Start: 2024-08-09

## 2024-08-09 RX ORDER — SODIUM CHLORIDE, SODIUM LACTATE, POTASSIUM CHLORIDE, CALCIUM CHLORIDE 600; 310; 30; 20 MG/100ML; MG/100ML; MG/100ML; MG/100ML
INJECTION, SOLUTION INTRAVENOUS CONTINUOUS
Status: DISCONTINUED | OUTPATIENT
Start: 2024-08-09 | End: 2024-08-09 | Stop reason: HOSPADM

## 2024-08-09 RX ORDER — SODIUM CHLORIDE 9 MG/ML
25 INJECTION, SOLUTION INTRAVENOUS PRN
Status: DISCONTINUED | OUTPATIENT
Start: 2024-08-09 | End: 2024-08-09 | Stop reason: HOSPADM

## 2024-08-09 RX ORDER — SODIUM CHLORIDE 0.9 % (FLUSH) 0.9 %
5-40 SYRINGE (ML) INJECTION EVERY 12 HOURS SCHEDULED
Status: DISCONTINUED | OUTPATIENT
Start: 2024-08-09 | End: 2024-08-09 | Stop reason: HOSPADM

## 2024-08-09 RX ORDER — SODIUM CHLORIDE 9 MG/ML
INJECTION, SOLUTION INTRAVENOUS PRN
Status: DISCONTINUED | OUTPATIENT
Start: 2024-08-09 | End: 2024-08-09 | Stop reason: HOSPADM

## 2024-08-09 RX ORDER — PROPOFOL 10 MG/ML
INJECTION, EMULSION INTRAVENOUS PRN
Status: DISCONTINUED | OUTPATIENT
Start: 2024-08-09 | End: 2024-08-09 | Stop reason: SDUPTHER

## 2024-08-09 RX ORDER — LIDOCAINE HYDROCHLORIDE 20 MG/ML
INJECTION, SOLUTION EPIDURAL; INFILTRATION; INTRACAUDAL; PERINEURAL PRN
Status: DISCONTINUED | OUTPATIENT
Start: 2024-08-09 | End: 2024-08-09 | Stop reason: SDUPTHER

## 2024-08-09 RX ORDER — LIDOCAINE HYDROCHLORIDE 10 MG/ML
1 INJECTION, SOLUTION INFILTRATION; PERINEURAL
Status: DISCONTINUED | OUTPATIENT
Start: 2024-08-09 | End: 2024-08-09 | Stop reason: HOSPADM

## 2024-08-09 RX ADMIN — SODIUM CHLORIDE, POTASSIUM CHLORIDE, SODIUM LACTATE AND CALCIUM CHLORIDE: 600; 310; 30; 20 INJECTION, SOLUTION INTRAVENOUS at 06:30

## 2024-08-09 RX ADMIN — LIDOCAINE HYDROCHLORIDE 50 MG: 20 INJECTION, SOLUTION EPIDURAL; INFILTRATION; INTRACAUDAL; PERINEURAL at 07:21

## 2024-08-09 RX ADMIN — PROPOFOL 180 MCG/KG/MIN: 10 INJECTION, EMULSION INTRAVENOUS at 07:21

## 2024-08-09 RX ADMIN — PROPOFOL 100 MG: 10 INJECTION, EMULSION INTRAVENOUS at 07:20

## 2024-08-09 ASSESSMENT — PAIN - FUNCTIONAL ASSESSMENT: PAIN_FUNCTIONAL_ASSESSMENT: 0-10

## 2024-08-09 NOTE — DISCHARGE INSTRUCTIONS
Gastrointestinal Esophagogastroduodenoscopy (EGD) and Colonoscopy- Discharge Instructions    1. Call Dr. Adams  at [phone] for any problems or questions.    2. Contact the doctor's office for follow up appointment as directed.    3. Medication may cause drowsiness for several hours, therefore, do not drive or operate machinery for remainder of the day.    4. No alcohol today.    5. Do not make any important decisions such as signing legal paperwork.    6. Ordinarily, you may resume regular diet and activity after exam unless otherwise specified by your physician.    7. For mild soreness in your throat you may use Cepacol throat lozenges or warm  salt-water gargles as needed.    8. Because of air put into your colon during exam, you may experience some abdominal distension, relieved by the passage of gas, for several hours.    9. Contact your physician if you have any of the following:  Excessive amount of bleeding - large amount when having a bowel movement.  Occasional specks of blood with bowel movement would not be unusual.  Severe abdominal pain  Fever or Chills        Any additional instructions:   Recommend colonoscopy in 10 years for screening purposes.

## 2024-08-09 NOTE — ANESTHESIA PRE PROCEDURE
(gastroesophageal reflux disease)     Psoriasis     Varicella        Past Surgical History:        Procedure Laterality Date    HERNIA REPAIR Bilateral     SEPTOPLASTY      nasal fracture    WISDOM TOOTH EXTRACTION         Social History:    Social History     Tobacco Use    Smoking status: Never    Smokeless tobacco: Never   Substance Use Topics    Alcohol use: Yes     Alcohol/week: 4.0 standard drinks of alcohol                                Counseling given: Not Answered      Vital Signs (Current):   Vitals:    08/05/24 0801 08/09/24 0621   BP:  117/69   Pulse:  62   Resp:  18   Temp:  97.9 °F (36.6 °C)   TempSrc:  Oral   SpO2:  96%   Weight: 81.6 kg (180 lb) 81.6 kg (180 lb)   Height: 1.791 m (5' 10.51\") 1.778 m (5' 10\")                                              BP Readings from Last 3 Encounters:   08/09/24 117/69   07/29/24 117/84   04/22/24 117/82       NPO Status: Time of last liquid consumption: 0130                        Time of last solid consumption: 2300                        Date of last liquid consumption: 08/09/24                        Date of last solid food consumption: 08/07/24    BMI:   Wt Readings from Last 3 Encounters:   08/09/24 81.6 kg (180 lb)   07/29/24 81.6 kg (180 lb)   04/22/24 81.6 kg (180 lb)     Body mass index is 25.83 kg/m².    CBC:   Lab Results   Component Value Date/Time    WBC 4.7 03/12/2024 08:18 AM    RBC 5.05 03/12/2024 08:18 AM    HGB 15.8 03/12/2024 08:18 AM    HCT 47.1 03/12/2024 08:18 AM    MCV 93.3 03/12/2024 08:18 AM    RDW 13.1 03/12/2024 08:18 AM     03/12/2024 08:18 AM       CMP:   Lab Results   Component Value Date/Time     03/12/2024 08:18 AM    K 4.0 03/12/2024 08:18 AM     03/12/2024 08:18 AM    CO2 29 03/12/2024 08:18 AM    BUN 10 03/12/2024 08:18 AM    CREATININE 0.90 03/12/2024 08:18 AM    GFRAA 127 09/30/2019 03:33 PM    LABGLOM >60 03/12/2024 08:18 AM    GLUCOSE 96 03/12/2024 08:18 AM    CALCIUM 9.6 03/12/2024 08:18 AM    BILITOT

## 2024-08-09 NOTE — OP NOTE
Endoscopy Note      Operative Report    Patient: Jordon Underwood MRN: [id number]      YOB: 1985  Age: 39 y.o.  Sex: male            Indications:   Abnormal labs for celiac disease, diarrhea, abdominal pain    Preoperative Evaluation: The patient was evaluated prior to the procedure in the GI lab admission area, the patient ASA was recorded .  Consent was obtained from the patient with the risk of perforation bleeding and aspiration.    Anesthesia: DERRICK-per anesthesia  Complications: None  EBL: Unremarkable  Procedure: The patient was sedated in the left lateral decubitus position. Scope was advance from the mouth to the third portion of the duodenum. The scope was withdrawn to the stomach and a refroflexed view was performed.     Findings: There is evidence of grade A esophagitis, cold forcep biopsies taken.  A few antral erosions were noted, biopsied.  There appeared to be villous blunting in the duodenum, cold forcep biopsies taken.    Postoperative Diagnosis: Same    Recommendations: Start omeprazole 20 mg daily, take 30 minutes before breakfast.  Await for biopsy results, you will receive a pathology letter within 2 weeks.           Preoperative Evaluation: The patient was evaluated prior to the procedure in the GI lab admission area, the patient ASA was recorded .  Consent was obtained from the patient with the risk of perforation bleeding and aspiration.    Anesthesia: DERRICK-per anesthesia    Complications: None; patient tolerated the procedure well.    EBL -insignificant      Procedure: The patient was sedated in the left lateral decubitus position.  Scope was advanced from the rectum to the cecum and TI.  Per gastroenterology society guideline recommendations, right side of the colon was examined twice. The scope was withdrawn to the rectum, retroflexed view was performed.  The rectal exam was normal.  Preparation was good.    Findings: Normal-appearing colon and terminal ileum.  Cold forcep

## 2024-08-09 NOTE — ANESTHESIA POSTPROCEDURE EVALUATION
Department of Anesthesiology  Postprocedure Note    Patient: Jordon Underwood  MRN: [id number]  YOB: 1985  Date of evaluation: 8/9/2024    Procedure Summary       Date: 08/09/24 Room / Location: [facility] ENDO 03 / [facility] ENDOSCOPY    Anesthesia Start: 0717 Anesthesia Stop: 0741    Procedures:       COLONOSCOPY BIOPSY      ESOPHAGOGASTRODUODENOSCOPY BIOPSY (Upper GI Region) Diagnosis:       Celiac disease      Lower abdominal pain      (Celiac disease [K90.0])      (Lower abdominal pain [R10.30])    Surgeons: Tyrese Adams DO Responsible Provider: Richard Cabezas MD    Anesthesia Type: TIVA ASA Status: 2            Anesthesia Type: No value filed.    Kiya Phase I: Kiya Score: 10    Kiya Phase II: Kiya Score: 10    Anesthesia Post Evaluation    Patient location during evaluation: bedside  Patient participation: complete - patient participated  Level of consciousness: awake and alert  Airway patency: patent  Nausea & Vomiting: no vomiting  Cardiovascular status: hemodynamically stable  Respiratory status: acceptable  Hydration status: euvolemic  Pain management: adequate    No notable events documented.

## 2024-08-13 ENCOUNTER — TELEPHONE (OUTPATIENT)
Dept: GASTROENTEROLOGY | Age: 39
End: 2024-08-13

## 2024-08-13 NOTE — TELEPHONE ENCOUNTER
Called patient with EGD/ colonoscopy biopsy results per Dr Adams:    \"Biopsy is were consistent with celiac disease.  Continue strict gluten-free diet.  Random biopsies of the colon did show increased lymphocytes which could be consistent with lymphocytic colitis.  If your diarrhea does not improve with the gluten-free diet would recommend starting Imodium once daily for the lymphocytic colitis.  Follow-up in 6 to 8 weeks.\"    Op note 8/9/2024  Findings: There is evidence of grade A esophagitis, cold forcep biopsies taken.  A few antral erosions were noted, biopsied.  There appeared to be villous blunting in the duodenum, cold forcep biopsies taken.  Postoperative Diagnosis: Same  Recommendations: Start omeprazole 20 mg daily, take 30 minutes before breakfast.    The rectal exam was normal.  Preparation was good.  Findings: Normal-appearing colon and terminal ileum.  Cold forcep biopsies were taken of the right colon to rule out microscopic colitis.  Postoperative Diagnosis: Same  Recommendations:   Recommend colonoscopy in 10 years for screening purposes.    Results/ recommendations reviewed with patient. He verbalized understanding. Scheduled follow up 10/2 @ 4:00.

## 2024-08-14 ENCOUNTER — TELEPHONE (OUTPATIENT)
Dept: GASTROENTEROLOGY | Age: 39
End: 2024-08-14

## 2024-08-14 DIAGNOSIS — E55.9 VITAMIN D DEFICIENCY: Primary | ICD-10-CM

## 2024-08-14 NOTE — TELEPHONE ENCOUNTER
Called to advise patient of message per Dr Adams:    \"1000 IU daily, repeat Vit D level in 3 months.\"    No answer. LVM with information, office # for any questions or concerns.

## 2024-10-29 ENCOUNTER — TELEPHONE (OUTPATIENT)
Dept: GASTROENTEROLOGY | Age: 39
End: 2024-10-29

## 2024-10-29 NOTE — PROGRESS NOTES
History:   Mr. Jordon Underwood is a 39 y.o. male following up with biopsy and lab proven celiac disease as well as lymphocytic colitis.  EGD also revealed some evidence of mild reflux esophagitis.  He is continue omeprazole 20 mg daily, following a gluten-free diet and uses Imodium as needed.  Symptoms are greatly improved.    Past medical, family and social histories, as well as medications and allergies, were reviewed and updated in the medical record as appropriate.    Past Medical History:   Diagnosis Date    Allergic rhinitis     never tested    Furuncle of left lower limb     GERD (gastroesophageal reflux disease)     Psoriasis     Varicella        Past Surgical History:   Procedure Laterality Date    COLONOSCOPY N/A 8/9/2024    COLONOSCOPY BIOPSY performed by Tyrese Adams DO at [facility] ENDOSCOPY    HERNIA REPAIR Bilateral     SEPTOPLASTY      nasal fracture    UPPER GASTROINTESTINAL ENDOSCOPY N/A 8/9/2024    ESOPHAGOGASTRODUODENOSCOPY BIOPSY performed by Tyrese Adams DO at [facility] ENDOSCOPY    WISDOM TOOTH EXTRACTION         Social History     Tobacco Use    Smoking status: Never    Smokeless tobacco: Never   Substance Use Topics    Alcohol use: Yes     Alcohol/week: 4.0 standard drinks of alcohol    Drug use: No       MEDs:     Current Outpatient Medications   Medication Sig Dispense Refill    Vitamins A & D (VITAMIN A & D) [address] units TABS Take by mouth      omeprazole (PRILOSEC) 20 MG delayed release capsule Take 1 capsule by mouth every morning (before breakfast) 30 capsule 3    cetirizine (ZYRTEC) 5 MG tablet Take 1 tablet by mouth daily      Guselkumab (TREMFYA) 100 MG/ML SOPN Inject into the skin       No current facility-administered medications for this visit.       ALLERGIES:    No Known Allergies    ROS:     Review of Systems   Constitutional:  Negative for activity change and fatigue.   HENT:  Negative for trouble swallowing.    Respiratory:  Negative for shortness of

## 2024-10-29 NOTE — TELEPHONE ENCOUNTER
Called to remind patient of appointment tomorrow @ 8:15 with lab prior:  Vitamin D 25 Hydroxy   Patient verbalized that he plans to go to the lab after the ov due to work constraints and also in case Dr Adams orders any additional labs.

## 2024-10-30 ENCOUNTER — OFFICE VISIT (OUTPATIENT)
Dept: GASTROENTEROLOGY | Age: 39
End: 2024-10-30
Payer: COMMERCIAL

## 2024-10-30 VITALS
WEIGHT: 180 LBS | SYSTOLIC BLOOD PRESSURE: 113 MMHG | DIASTOLIC BLOOD PRESSURE: 77 MMHG | BODY MASS INDEX: 25.83 KG/M2 | RESPIRATION RATE: 18 BRPM | OXYGEN SATURATION: 98 % | HEART RATE: 67 BPM

## 2024-10-30 DIAGNOSIS — E55.9 VITAMIN D DEFICIENCY: ICD-10-CM

## 2024-10-30 DIAGNOSIS — K90.0 CELIAC DISEASE: Primary | ICD-10-CM

## 2024-10-30 DIAGNOSIS — K90.0 CELIAC DISEASE: ICD-10-CM

## 2024-10-30 DIAGNOSIS — K52.832 LYMPHOCYTIC COLITIS: ICD-10-CM

## 2024-10-30 LAB — 25(OH)D3 SERPL-MCNC: 39.1 NG/ML (ref 30–100)

## 2024-10-30 PROCEDURE — 99214 OFFICE O/P EST MOD 30 MIN: CPT | Performed by: INTERNAL MEDICINE

## 2024-10-30 RX ORDER — VITAMIN A 10000 UNIT
TABLET ORAL
COMMUNITY

## 2024-10-30 ASSESSMENT — ENCOUNTER SYMPTOMS
COLOR CHANGE: 0
DIARRHEA: 0
NAUSEA: 0
SHORTNESS OF BREATH: 0
ABDOMINAL DISTENTION: 0
ABDOMINAL PAIN: 0
BLOOD IN STOOL: 0
CONSTIPATION: 0
TROUBLE SWALLOWING: 0
VOMITING: 0

## 2024-11-02 LAB — TTG IGA SER-ACNC: 3 U/ML (ref 0–3)

## 2024-11-05 ENCOUNTER — TELEPHONE (OUTPATIENT)
Dept: GASTROENTEROLOGY | Age: 39
End: 2024-11-05

## 2024-11-05 NOTE — TELEPHONE ENCOUNTER
Called patient with lab results per Dr Adams:    \"Improvement of tTG-IgA shows avoidance and improvement of celiac disease.\"    11/2/2024  Tissue Transglutaminase IgA  0 - 3 U/mL 3     No answer and mail box is full. Will send via my chart.

## 2024-11-21 ENCOUNTER — TELEPHONE (OUTPATIENT)
Dept: GASTROENTEROLOGY | Age: 39
End: 2024-11-21

## 2024-11-21 NOTE — TELEPHONE ENCOUNTER
Pt called asking about his lab results.  He also asked that, since his Vitamin D is WNL now, if he should continue his vitamin D supplement, and if so, at what dose?

## 2025-02-25 ENCOUNTER — OFFICE VISIT (OUTPATIENT)
Dept: INTERNAL MEDICINE CLINIC | Facility: CLINIC | Age: 40
End: 2025-02-25
Payer: COMMERCIAL

## 2025-02-25 VITALS
WEIGHT: 176 LBS | DIASTOLIC BLOOD PRESSURE: 67 MMHG | SYSTOLIC BLOOD PRESSURE: 110 MMHG | HEIGHT: 70 IN | BODY MASS INDEX: 25.2 KG/M2 | HEART RATE: 64 BPM | TEMPERATURE: 97.6 F | OXYGEN SATURATION: 98 %

## 2025-02-25 DIAGNOSIS — T16.9XXA: Primary | ICD-10-CM

## 2025-02-25 PROCEDURE — 99212 OFFICE O/P EST SF 10 MIN: CPT | Performed by: INTERNAL MEDICINE

## 2025-02-25 SDOH — ECONOMIC STABILITY: FOOD INSECURITY: WITHIN THE PAST 12 MONTHS, THE FOOD YOU BOUGHT JUST DIDN'T LAST AND YOU DIDN'T HAVE MONEY TO GET MORE.: NEVER TRUE

## 2025-02-25 SDOH — ECONOMIC STABILITY: FOOD INSECURITY: WITHIN THE PAST 12 MONTHS, YOU WORRIED THAT YOUR FOOD WOULD RUN OUT BEFORE YOU GOT MONEY TO BUY MORE.: NEVER TRUE

## 2025-02-25 ASSESSMENT — PATIENT HEALTH QUESTIONNAIRE - PHQ9
SUM OF ALL RESPONSES TO PHQ QUESTIONS 1-9: 0
2. FEELING DOWN, DEPRESSED OR HOPELESS: NOT AT ALL
SUM OF ALL RESPONSES TO PHQ QUESTIONS 1-9: 0
SUM OF ALL RESPONSES TO PHQ QUESTIONS 1-9: 0
SUM OF ALL RESPONSES TO PHQ9 QUESTIONS 1 & 2: 0
SUM OF ALL RESPONSES TO PHQ QUESTIONS 1-9: 0
1. LITTLE INTEREST OR PLEASURE IN DOING THINGS: NOT AT ALL

## 2025-02-25 ASSESSMENT — ANXIETY QUESTIONNAIRES
IF YOU CHECKED OFF ANY PROBLEMS ON THIS QUESTIONNAIRE, HOW DIFFICULT HAVE THESE PROBLEMS MADE IT FOR YOU TO DO YOUR WORK, TAKE CARE OF THINGS AT HOME, OR GET ALONG WITH OTHER PEOPLE: NOT DIFFICULT AT ALL
1. FEELING NERVOUS, ANXIOUS, OR ON EDGE: NOT AT ALL
3. WORRYING TOO MUCH ABOUT DIFFERENT THINGS: NOT AT ALL
4. TROUBLE RELAXING: NOT AT ALL
2. NOT BEING ABLE TO STOP OR CONTROL WORRYING: NOT AT ALL
7. FEELING AFRAID AS IF SOMETHING AWFUL MIGHT HAPPEN: NOT AT ALL
GAD7 TOTAL SCORE: 0
6. BECOMING EASILY ANNOYED OR IRRITABLE: NOT AT ALL
5. BEING SO RESTLESS THAT IT IS HARD TO SIT STILL: NOT AT ALL

## 2025-02-25 NOTE — PROGRESS NOTES
[facility]  Gera Lucero M.D.  Internal Medicine  [address] [place] [address]  Office : [phone]  Fax : [phone]    Chief Complaint   Patient presents with    Ear Fullness     Ear fullness/ and pain left ear for the past couple weeks        History of Present Illness:  Jordon Underwood is a 39 y.o. male.  Ear Fullness   There is pain in both ears. This is a new problem. The current episode started 1 to 4 weeks ago. The problem occurs constantly. The problem has been rapidly improving. There has been no fever. Pertinent negatives include no ear discharge. He has tried nothing for the symptoms. The treatment provided no relief.           Past Medical History:  Past Medical History:   Diagnosis Date    Allergic rhinitis     never tested    Furuncle of left lower limb     GERD (gastroesophageal reflux disease)     Psoriasis     Varicella      Past Surgical History:  Past Surgical History:   Procedure Laterality Date    COLONOSCOPY N/A 8/9/2024    COLONOSCOPY BIOPSY performed by Tyrese Adams DO at [facility] ENDOSCOPY    HERNIA REPAIR Bilateral     SEPTOPLASTY      nasal fracture    UPPER GASTROINTESTINAL ENDOSCOPY N/A 8/9/2024    ESOPHAGOGASTRODUODENOSCOPY BIOPSY performed by Tyrese Adams DO at [facility] ENDOSCOPY    WISDOM TOOTH EXTRACTION       Allergies:   No Known Allergies  Medications:   Current Outpatient Medications   Medication Sig Dispense Refill    Vitamins A & D (VITAMIN A & D) [address] units TABS Take by mouth      omeprazole (PRILOSEC) 20 MG delayed release capsule Take 1 capsule by mouth every morning (before breakfast) 30 capsule 3    cetirizine (ZYRTEC) 5 MG tablet Take 1 tablet by mouth daily      Guselkumab (TREMFYA) 100 MG/ML SOPN Inject into the skin       No current facility-administered medications for this visit.     Social History:  Social History     Tobacco Use    Smoking status: Never    Smokeless tobacco: Never

## 2025-03-13 ENCOUNTER — OFFICE VISIT (OUTPATIENT)
Dept: INTERNAL MEDICINE CLINIC | Facility: CLINIC | Age: 40
End: 2025-03-13
Payer: COMMERCIAL

## 2025-03-13 VITALS
HEIGHT: 70 IN | WEIGHT: 180 LBS | BODY MASS INDEX: 25.77 KG/M2 | HEART RATE: 63 BPM | SYSTOLIC BLOOD PRESSURE: 130 MMHG | DIASTOLIC BLOOD PRESSURE: 85 MMHG | TEMPERATURE: 97.4 F | OXYGEN SATURATION: 98 %

## 2025-03-13 DIAGNOSIS — K52.832 LYMPHOCYTIC COLITIS: ICD-10-CM

## 2025-03-13 DIAGNOSIS — Z00.00 ANNUAL PHYSICAL EXAM: ICD-10-CM

## 2025-03-13 DIAGNOSIS — E55.9 VITAMIN D DEFICIENCY: ICD-10-CM

## 2025-03-13 DIAGNOSIS — Z00.00 ANNUAL PHYSICAL EXAM: Primary | ICD-10-CM

## 2025-03-13 LAB
ALBUMIN SERPL-MCNC: 4 G/DL (ref 3.5–5)
ALBUMIN/GLOB SERPL: 1.4 (ref 1–1.9)
ALP SERPL-CCNC: 64 U/L (ref 40–129)
ALT SERPL-CCNC: 16 U/L (ref 8–55)
ANION GAP SERPL CALC-SCNC: 11 MMOL/L (ref 7–16)
APPEARANCE UR: CLEAR
AST SERPL-CCNC: 21 U/L (ref 15–37)
BASOPHILS # BLD: 0.05 K/UL (ref 0–0.2)
BASOPHILS NFR BLD: 0.9 % (ref 0–2)
BILIRUB DIRECT SERPL-MCNC: <0.2 MG/DL (ref 0–0.4)
BILIRUB SERPL-MCNC: 0.5 MG/DL (ref 0–1.2)
BILIRUB UR QL: NEGATIVE
BUN SERPL-MCNC: 12 MG/DL (ref 6–23)
CALCIUM SERPL-MCNC: 9.4 MG/DL (ref 8.8–10.2)
CHLORIDE SERPL-SCNC: 104 MMOL/L (ref 98–107)
CHOLEST SERPL-MCNC: 185 MG/DL (ref 0–200)
CO2 SERPL-SCNC: 26 MMOL/L (ref 20–29)
COLOR UR: NORMAL
CREAT SERPL-MCNC: 0.98 MG/DL (ref 0.8–1.3)
DIFFERENTIAL METHOD BLD: NORMAL
EOSINOPHIL # BLD: 0.14 K/UL (ref 0–0.8)
EOSINOPHIL NFR BLD: 2.6 % (ref 0.5–7.8)
ERYTHROCYTE [DISTWIDTH] IN BLOOD BY AUTOMATED COUNT: 13.2 % (ref 11.9–14.6)
GLOBULIN SER CALC-MCNC: 2.8 G/DL (ref 2.3–3.5)
GLUCOSE SERPL-MCNC: 92 MG/DL (ref 70–99)
GLUCOSE UR STRIP.AUTO-MCNC: NEGATIVE MG/DL
HCT VFR BLD AUTO: 47.1 % (ref 41.1–50.3)
HDLC SERPL-MCNC: 54 MG/DL (ref 40–60)
HDLC SERPL: 3.4 (ref 0–5)
HGB BLD-MCNC: 15.9 G/DL (ref 13.6–17.2)
HGB UR QL STRIP: NEGATIVE
IMM GRANULOCYTES # BLD AUTO: 0.04 K/UL (ref 0–0.5)
IMM GRANULOCYTES NFR BLD AUTO: 0.8 % (ref 0–5)
KETONES UR QL STRIP.AUTO: NEGATIVE MG/DL
LDLC SERPL CALC-MCNC: 119 MG/DL (ref 0–100)
LEUKOCYTE ESTERASE UR QL STRIP.AUTO: NEGATIVE
LYMPHOCYTES # BLD: 1.11 K/UL (ref 0.5–4.6)
LYMPHOCYTES NFR BLD: 20.9 % (ref 13–44)
MCH RBC QN AUTO: 31.8 PG (ref 26.1–32.9)
MCHC RBC AUTO-ENTMCNC: 33.8 G/DL (ref 31.4–35)
MCV RBC AUTO: 94.2 FL (ref 82–102)
MONOCYTES # BLD: 0.58 K/UL (ref 0.1–1.3)
MONOCYTES NFR BLD: 10.9 % (ref 4–12)
NEUTS SEG # BLD: 3.4 K/UL (ref 1.7–8.2)
NEUTS SEG NFR BLD: 63.9 % (ref 43–78)
NITRITE UR QL STRIP.AUTO: NEGATIVE
NRBC # BLD: 0 K/UL (ref 0–0.2)
PH UR STRIP: 7 (ref 5–9)
PLATELET # BLD AUTO: 290 K/UL (ref 150–450)
PMV BLD AUTO: 10.3 FL (ref 9.4–12.3)
POTASSIUM SERPL-SCNC: 4 MMOL/L (ref 3.5–5.1)
PROT SERPL-MCNC: 6.9 G/DL (ref 6.3–8.2)
PROT UR STRIP-MCNC: NEGATIVE MG/DL
PSA SERPL-MCNC: 0.7 NG/ML (ref 0–4)
RBC # BLD AUTO: 5 M/UL (ref 4.23–5.6)
SODIUM SERPL-SCNC: 140 MMOL/L (ref 136–145)
SP GR UR REFRACTOMETRY: <1.005 (ref 1–1.02)
TRIGL SERPL-MCNC: 58 MG/DL (ref 0–150)
TSH, 3RD GENERATION: 1.52 UIU/ML (ref 0.27–4.2)
UROBILINOGEN UR QL STRIP.AUTO: 0.2 EU/DL (ref 0.2–1)
VLDLC SERPL CALC-MCNC: 12 MG/DL (ref 6–23)
WBC # BLD AUTO: 5.3 K/UL (ref 4.3–11.1)

## 2025-03-13 PROCEDURE — 99395 PREV VISIT EST AGE 18-39: CPT | Performed by: INTERNAL MEDICINE

## 2025-03-13 ASSESSMENT — ENCOUNTER SYMPTOMS
FACIAL SWELLING: 0
SHORTNESS OF BREATH: 0
EYE REDNESS: 0
DIARRHEA: 0
ABDOMINAL PAIN: 0

## 2025-03-13 NOTE — PROGRESS NOTES
[facility]  Gera Lucero M.D.  Internal Medicine  [address] [place] [id number]  Office : [phone]  Fax : [phone]    Chief Complaint   Patient presents with    Annual Exam     1 yr CPE       History of Present Illness:  Jordon Underwood is a 39 y.o. male.  HPI    Wellness Physical  Patient feels well today.  Health Maintenance updated and appropriate screenings/preventative treatments are ordered if patient gives consent.    Counseling/anticipatory guidance/risk factor reduction interventions appropriate for this patient are discussed including but not limited to:    Achieving normal body weight with low carb diet and intermittent fasting  Regular moderate exercise  Smoking cessation if appropriate  Avoidance of excessive alcohol/caffeine use  Seatbelt use  Keeping medical appointments  Taking medication as directed    Celiac Disease/Lymphocytic colitis  GI Managing and he follows a gluten free diet    Vitamin D Deficiency  Improved on vitamin D  Lab Results   Component Value Date/Time    VITD25 39.1 10/30/2024 08:55 AM          Past Medical History:  Past Medical History:   Diagnosis Date    Allergic rhinitis     never tested    Furuncle of left lower limb     GERD (gastroesophageal reflux disease)     Psoriasis     Varicella      Past Surgical History:  Past Surgical History:   Procedure Laterality Date    COLONOSCOPY N/A 8/9/2024    COLONOSCOPY BIOPSY performed by Tyrese Adams DO at [facility] ENDOSCOPY    HERNIA REPAIR Bilateral     SEPTOPLASTY      nasal fracture    UPPER GASTROINTESTINAL ENDOSCOPY N/A 8/9/2024    ESOPHAGOGASTRODUODENOSCOPY BIOPSY performed by Tyrese Adams DO at [facility] ENDOSCOPY    WISDOM TOOTH EXTRACTION       Allergies:   Allergies   Allergen Reactions    Gluten Other (See Comments)     Celiac disease     Medications:   Current Outpatient Medications   Medication Sig Dispense Refill    Vitamins A & D (VITAMIN A & D)

## 2025-03-14 ENCOUNTER — RESULTS FOLLOW-UP (OUTPATIENT)
Dept: INTERNAL MEDICINE CLINIC | Facility: CLINIC | Age: 40
End: 2025-03-14

## 2025-04-23 ENCOUNTER — TELEPHONE (OUTPATIENT)
Dept: GASTROENTEROLOGY | Age: 40
End: 2025-04-23

## 2025-04-23 NOTE — TELEPHONE ENCOUNTER
Attempted to call patient regarding appointment with Dr. Adams on 4/30. The provider is now a hospitalist and the appointment needs to be rescheduled with one of our other providers.

## 2025-05-14 ENCOUNTER — OFFICE VISIT (OUTPATIENT)
Dept: INTERNAL MEDICINE CLINIC | Facility: CLINIC | Age: 40
End: 2025-05-14
Payer: COMMERCIAL

## 2025-05-14 VITALS
SYSTOLIC BLOOD PRESSURE: 107 MMHG | WEIGHT: 180 LBS | HEIGHT: 70 IN | OXYGEN SATURATION: 99 % | DIASTOLIC BLOOD PRESSURE: 66 MMHG | HEART RATE: 56 BPM | BODY MASS INDEX: 25.77 KG/M2 | TEMPERATURE: 97.4 F

## 2025-05-14 DIAGNOSIS — M25.512 ARTHRALGIA OF LEFT ACROMIOCLAVICULAR JOINT: Primary | ICD-10-CM

## 2025-05-14 PROCEDURE — 99213 OFFICE O/P EST LOW 20 MIN: CPT | Performed by: INTERNAL MEDICINE

## 2025-05-14 NOTE — PROGRESS NOTES
[facility]  Gera Lucero M.D.  Internal Medicine  [address] [place] [address]  Office : [phone]  Fax : [phone]    Chief Complaint   Patient presents with    Shoulder Pain     Possible left shoulder injury from lifting weights last week        History of Present Illness:  Jordon Underwood is a 40 y.o. male.  Shoulder Pain   The pain is present in the left shoulder. This is a new problem. The current episode started in the past 7 days. History of extremity trauma: lifting weights with a . The problem has been unchanged. The quality of the pain is described as aching. The pain is mild. Pertinent negatives include no fever, joint locking, joint swelling, limited range of motion, numbness, stiffness or tingling. The symptoms are aggravated by contact. He has tried NSAIDS for the symptoms. The treatment provided no relief.           Past Medical History:  Past Medical History:   Diagnosis Date    Allergic rhinitis     never tested    Furuncle of left lower limb     GERD (gastroesophageal reflux disease)     Psoriasis     Varicella      Past Surgical History:  Past Surgical History:   Procedure Laterality Date    COLONOSCOPY N/A 8/9/2024    COLONOSCOPY BIOPSY performed by Tyrese Adams DO at [facility] ENDOSCOPY    HERNIA REPAIR Bilateral     SEPTOPLASTY      nasal fracture    UPPER GASTROINTESTINAL ENDOSCOPY N/A 8/9/2024    ESOPHAGOGASTRODUODENOSCOPY BIOPSY performed by Tryese Adams DO at [facility] ENDOSCOPY    WISDOM TOOTH EXTRACTION       Allergies:   Allergies   Allergen Reactions    Gluten Other (See Comments)     Celiac disease     Medications:   Current Outpatient Medications   Medication Sig Dispense Refill    Vitamins A & D (VITAMIN A & D) [address] units TABS Take by mouth      omeprazole (PRILOSEC) 20 MG delayed release capsule Take 1 capsule by mouth every morning (before breakfast) 30 capsule 3    cetirizine (ZYRTEC) 5 MG

## (undated) DEVICE — LUBE JELLY FOIL PACK 1.4 OZ: Brand: MEDLINE INDUSTRIES, INC.

## (undated) DEVICE — SYRINGE MEDICAL 3ML CLEAR PLASTIC STANDARD NON CONTROL LUERLOCK TIP DISPOSABLE

## (undated) DEVICE — KENDALL RADIOLUCENT FOAM MONITORING ELECTRODE RECTANGULAR SHAPE: Brand: KENDALL

## (undated) DEVICE — NEEDLE SYRINGE 18GA L1.5IN RED PLAS HUB S STL BLNT FILL W/O

## (undated) DEVICE — CONNECTOR TBNG OD5-7MM O2 END DISP

## (undated) DEVICE — AIRLIFE™ OXYGEN TUBING 7 FEET (2.1 M) CRUSH RESISTANT OXYGEN TUBING, VINYL TIPPED: Brand: AIRLIFE™

## (undated) DEVICE — FORCEPS BX L240CM JAW DIA2.8MM L CAP W/ NDL MIC MESH TOOTH

## (undated) DEVICE — SYRINGE MED 10ML LUERLOCK TIP W/O SFTY DISP

## (undated) DEVICE — CANNULA NSL ORAL AD FOR CAPNOFLEX CO2 O2 AIRLFE

## (undated) DEVICE — BLOCK BITE AD 60FR W/ VELC STRP ADDRESSES MOST PT AND

## (undated) DEVICE — SINGLE PORT MANIFOLD: Brand: NEPTUNE 2

## (undated) DEVICE — MOUTHPIECE ENDOSCP L CTRL OPN AND SIDE PORTS DISP

## (undated) DEVICE — CONTAINER FORMALIN PREFILLED 10% NBF 60ML

## (undated) DEVICE — GAUZE,SPONGE,4"X4",12PLY,WOVEN,NS,LF: Brand: MEDLINE

## (undated) DEVICE — ENDOSCOPIC KIT 1.1+ OP4 CA DE 2 GWN AAMI LEVEL 3